# Patient Record
Sex: FEMALE
[De-identification: names, ages, dates, MRNs, and addresses within clinical notes are randomized per-mention and may not be internally consistent; named-entity substitution may affect disease eponyms.]

---

## 2021-09-20 PROBLEM — Z00.00 ENCOUNTER FOR PREVENTIVE HEALTH EXAMINATION: Status: ACTIVE | Noted: 2021-09-20

## 2021-10-20 PROBLEM — Z78.9 SOCIAL ALCOHOL USE: Status: ACTIVE | Noted: 2021-10-20

## 2021-10-20 PROBLEM — Z83.2 FAMILY HISTORY OF ANEMIA: Status: ACTIVE | Noted: 2021-10-20

## 2021-10-20 PROBLEM — Z80.1 FAMILY HISTORY OF LUNG CANCER: Status: ACTIVE | Noted: 2021-10-20

## 2021-10-20 PROBLEM — E03.9 ADULT HYPOTHYROIDISM: Status: ACTIVE | Noted: 2021-10-20

## 2021-10-20 PROBLEM — Z82.0 FAMILY HISTORY OF PARKINSON'S DISEASE: Status: ACTIVE | Noted: 2021-10-20

## 2021-10-20 PROBLEM — Z78.9 NO PERTINENT PAST MEDICAL HISTORY: Status: RESOLVED | Noted: 2021-10-20 | Resolved: 2021-10-20

## 2021-10-20 PROBLEM — Z87.891 FORMER SMOKER: Status: ACTIVE | Noted: 2021-10-20

## 2021-10-20 RX ORDER — LIOTHYRONINE SODIUM 50 UG/1
TABLET ORAL
Refills: 0 | Status: ACTIVE | COMMUNITY

## 2021-10-21 ENCOUNTER — APPOINTMENT (OUTPATIENT)
Dept: UROLOGY | Facility: CLINIC | Age: 66
End: 2021-10-21
Payer: COMMERCIAL

## 2021-10-21 VITALS — HEART RATE: 108 BPM | TEMPERATURE: 98 F | SYSTOLIC BLOOD PRESSURE: 146 MMHG | DIASTOLIC BLOOD PRESSURE: 86 MMHG

## 2021-10-21 DIAGNOSIS — R39.9 UNSPECIFIED SYMPTOMS AND SIGNS INVOLVING THE GENITOURINARY SYSTEM: ICD-10-CM

## 2021-10-21 DIAGNOSIS — Z82.0 FAMILY HISTORY OF EPILEPSY AND OTHER DISEASES OF THE NERVOUS SYSTEM: ICD-10-CM

## 2021-10-21 DIAGNOSIS — K57.90 DIVERTICULOSIS OF INTESTINE, PART UNSPECIFIED, W/OUT PERFORATION OR ABSCESS W/OUT BLEEDING: ICD-10-CM

## 2021-10-21 DIAGNOSIS — E03.9 HYPOTHYROIDISM, UNSPECIFIED: ICD-10-CM

## 2021-10-21 DIAGNOSIS — Z78.9 OTHER SPECIFIED HEALTH STATUS: ICD-10-CM

## 2021-10-21 DIAGNOSIS — Z83.2 FAMILY HISTORY OF DISEASES OF THE BLOOD AND BLOOD-FORMING ORGANS AND CERTAIN DISORDERS INVOLVING THE IMMUNE MECHANISM: ICD-10-CM

## 2021-10-21 DIAGNOSIS — R31.29 OTHER MICROSCOPIC HEMATURIA: ICD-10-CM

## 2021-10-21 DIAGNOSIS — D51.0 VITAMIN B12 DEFICIENCY ANEMIA DUE TO INTRINSIC FACTOR DEFICIENCY: ICD-10-CM

## 2021-10-21 DIAGNOSIS — K42.9 UMBILICAL HERNIA W/OUT OBSTRUCTION OR GANGRENE: ICD-10-CM

## 2021-10-21 DIAGNOSIS — D17.71 BENIGN LIPOMATOUS NEOPLASM OF KIDNEY: ICD-10-CM

## 2021-10-21 DIAGNOSIS — R32 UNSPECIFIED URINARY INCONTINENCE: ICD-10-CM

## 2021-10-21 DIAGNOSIS — Z87.891 PERSONAL HISTORY OF NICOTINE DEPENDENCE: ICD-10-CM

## 2021-10-21 DIAGNOSIS — Z80.1 FAMILY HISTORY OF MALIGNANT NEOPLASM OF TRACHEA, BRONCHUS AND LUNG: ICD-10-CM

## 2021-10-21 LAB
BILIRUB UR QL STRIP: NORMAL
CLARITY UR: CLEAR
COLLECTION METHOD: NORMAL
GLUCOSE UR-MCNC: NORMAL
HCG UR QL: 0.2 EU/DL
HGB UR QL STRIP.AUTO: NORMAL
KETONES UR-MCNC: NORMAL
LEUKOCYTE ESTERASE UR QL STRIP: NORMAL
NITRITE UR QL STRIP: NORMAL
PH UR STRIP: 5
PROT UR STRIP-MCNC: NORMAL
SP GR UR STRIP: 1

## 2021-10-21 PROCEDURE — 51798 US URINE CAPACITY MEASURE: CPT

## 2021-10-21 PROCEDURE — 99205 OFFICE O/P NEW HI 60 MIN: CPT

## 2021-10-21 RX ORDER — LEVOTHYROXINE SODIUM 100 UG/1
100 CAPSULE ORAL
Refills: 0 | Status: ACTIVE | COMMUNITY

## 2021-10-21 RX ORDER — OXYBUTYNIN CHLORIDE 10 MG/1
10 TABLET, EXTENDED RELEASE ORAL DAILY
Qty: 90 | Refills: 3 | Status: ACTIVE | COMMUNITY
Start: 2021-10-21 | End: 1900-01-01

## 2021-10-21 NOTE — ASSESSMENT
[FreeTextEntry1] : I discussed the findings and options with Ms. AL LOPEZ in detail. \par \par As a first step, she should incorporate behavioral modification, including significantly decreasing her caffeine intake and implementing timed voiding. If this is not sufficient in decreasing the incontinence episodes, I have prescribed Ditropan 10mg XL and explained the possible side effects in detail. \par \par Additionally, I have ordered a CT of the abdomen and pelvis at her request. She will forego another cystoscopy for her persistent microhematuria, having had one in 2018. I will call her with the result. A urine culture and cytology was also sent.\par \par Providing there are no new problems, I look forward to seeing Ms. Lopez in one year (bladder sono). \par

## 2021-10-21 NOTE — PHYSICAL EXAM
[General Appearance - Well Developed] : well developed [General Appearance - Well Nourished] : well nourished [Normal Appearance] : normal appearance [Well Groomed] : well groomed [General Appearance - In No Acute Distress] : no acute distress [Edema] : no peripheral edema [Respiration, Rhythm And Depth] : normal respiratory rhythm and effort [Exaggerated Use Of Accessory Muscles For Inspiration] : no accessory muscle use [Abdomen Soft] : soft [Abdomen Tenderness] : non-tender [Costovertebral Angle Tenderness] : no ~M costovertebral angle tenderness [Urinary Bladder Findings] : the bladder was normal on palpation [Normal Station and Gait] : the gait and station were normal for the patient's age [] : no rash [No Focal Deficits] : no focal deficits [Oriented To Time, Place, And Person] : oriented to person, place, and time [Affect] : the affect was normal [Mood] : the mood was normal [Not Anxious] : not anxious [No Palpable Adenopathy] : no palpable adenopathy [Heart Rate And Rhythm] : Heart rate and rhythm were normal [Abdomen Mass (___ Cm)] : no abdominal mass palpated [Urethral Meatus] : normal urethra [Vagina] : normal vaginal exam [Skin Color & Pigmentation] : normal skin color and pigmentation

## 2021-10-21 NOTE — HISTORY OF PRESENT ILLNESS
[FreeTextEntry1] : Ms. AL LOPEZ comes in today for a urologic evaluation, having been seen last in 2018.  In 2020, she began having progressively worsening incontinence. She now wears 3 pads daily, which are mildly soiled. . Ms. Lopez continues to drink four caffeinated beverages daily, including two Red Bulls. \par \par Ms. Lopez has a history of persistent asymptomatic microhematuria without gross hematuria. She has no history of urolithiasis or trauma and these episodes were not associated with any change in her voiding pattern. Ms. Lopez underwent a full negative evaluation for microhematuria during her last visit in . \par \par From her general urologic history, Ms. Lopez reports occasional predominantly irritative voiding symptoms. She reports mixed incontinence, with the urge component being more pronounced.\par Sono: 1cc PVR\par \par She is  3, para 0. 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 Fibroid uterus. Umbilical hernia and bilateral inguinal hernias. Sigmoid diverticulosis;

## 2021-10-21 NOTE — ADDENDUM
[FreeTextEntry1] : A portion of this note was written by [Rustam Deluna] on 10/20/2021 acting as a scribe for Dr. Morse. \par \par I have personally reviewed the chart and agree that the record accurately reflects my personal performance of the history, physical exam, assessment, and plan.

## 2021-10-22 LAB — URINE CYTOLOGY: NORMAL

## 2021-10-25 ENCOUNTER — NON-APPOINTMENT (OUTPATIENT)
Age: 66
End: 2021-10-25

## 2021-10-25 LAB — BACTERIA UR CULT: NORMAL
